# Patient Record
Sex: FEMALE | Race: AMERICAN INDIAN OR ALASKA NATIVE
[De-identification: names, ages, dates, MRNs, and addresses within clinical notes are randomized per-mention and may not be internally consistent; named-entity substitution may affect disease eponyms.]

---

## 2020-11-12 ENCOUNTER — HOSPITAL ENCOUNTER (OUTPATIENT)
Dept: HOSPITAL 5 - ED | Age: 55
Setting detail: OBSERVATION
LOS: 1 days | Discharge: HOME | End: 2020-11-13
Attending: INTERNAL MEDICINE | Admitting: INTERNAL MEDICINE
Payer: COMMERCIAL

## 2020-11-12 DIAGNOSIS — E78.5: ICD-10-CM
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
## 2020-11-12 DIAGNOSIS — R55: ICD-10-CM
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
## 2020-11-12 DIAGNOSIS — S70.11XA: Primary | ICD-10-CM

## 2020-11-12 DIAGNOSIS — I10: ICD-10-CM

## 2020-11-12 DIAGNOSIS — F17.210: ICD-10-CM
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
## 2020-11-12 DIAGNOSIS — E66.9: ICD-10-CM

## 2020-11-12 DIAGNOSIS — E16.2: ICD-10-CM

## 2020-11-12 LAB
ALBUMIN SERPL-MCNC: 4 G/DL (ref 3.9–5)
ALT SERPL-CCNC: 24 UNITS/L (ref 7–56)
BASOPHILS # (AUTO): 0.1 K/MM3 (ref 0–0.1)
BASOPHILS NFR BLD AUTO: 0.6 % (ref 0–1.8)
BILIRUB UR QL STRIP: (no result)
BLOOD UR QL VISUAL: (no result)
BUN SERPL-MCNC: 10 MG/DL (ref 7–17)
BUN/CREAT SERPL: 17 %
CALCIUM SERPL-MCNC: 9.2 MG/DL (ref 8.4–10.2)
EOSINOPHIL # BLD AUTO: 0 K/MM3 (ref 0–0.4)
EOSINOPHIL NFR BLD AUTO: 0.4 % (ref 0–4.3)
HCT VFR BLD CALC: 39.1 % (ref 30.3–42.9)
HEMOLYSIS INDEX: 2
HGB BLD-MCNC: 12.6 GM/DL (ref 10.1–14.3)
INR PPP: 0.9 (ref 0.87–1.13)
LYMPHOCYTES # BLD AUTO: 2.5 K/MM3 (ref 1.2–5.4)
LYMPHOCYTES NFR BLD AUTO: 22.1 % (ref 13.4–35)
MCHC RBC AUTO-ENTMCNC: 32 % (ref 30–34)
MCV RBC AUTO: 80 FL (ref 79–97)
MONOCYTES # (AUTO): 1 K/MM3 (ref 0–0.8)
MONOCYTES % (AUTO): 8.5 % (ref 0–7.3)
MUCOUS THREADS #/AREA URNS HPF: (no result) /HPF
PH UR STRIP: 6 [PH] (ref 5–7)
PLATELET # BLD: 251 K/MM3 (ref 140–440)
PROT UR STRIP-MCNC: (no result) MG/DL
RBC # BLD AUTO: 4.88 M/MM3 (ref 3.65–5.03)
RBC #/AREA URNS HPF: < 1 /HPF (ref 0–6)
UROBILINOGEN UR-MCNC: < 2 MG/DL (ref ?–2)
WBC #/AREA URNS HPF: 2 /HPF (ref 0–6)

## 2020-11-12 PROCEDURE — 93880 EXTRACRANIAL BILAT STUDY: CPT

## 2020-11-12 PROCEDURE — 83735 ASSAY OF MAGNESIUM: CPT

## 2020-11-12 PROCEDURE — 72170 X-RAY EXAM OF PELVIS: CPT

## 2020-11-12 PROCEDURE — 82962 GLUCOSE BLOOD TEST: CPT

## 2020-11-12 PROCEDURE — G0378 HOSPITAL OBSERVATION PER HR: HCPCS

## 2020-11-12 PROCEDURE — 85025 COMPLETE CBC W/AUTO DIFF WBC: CPT

## 2020-11-12 PROCEDURE — 71045 X-RAY EXAM CHEST 1 VIEW: CPT

## 2020-11-12 PROCEDURE — 80048 BASIC METABOLIC PNL TOTAL CA: CPT

## 2020-11-12 PROCEDURE — 96374 THER/PROPH/DIAG INJ IV PUSH: CPT

## 2020-11-12 PROCEDURE — 85379 FIBRIN DEGRADATION QUANT: CPT

## 2020-11-12 PROCEDURE — 80320 DRUG SCREEN QUANTALCOHOLS: CPT

## 2020-11-12 PROCEDURE — 84439 ASSAY OF FREE THYROXINE: CPT

## 2020-11-12 PROCEDURE — 36415 COLL VENOUS BLD VENIPUNCTURE: CPT

## 2020-11-12 PROCEDURE — 96375 TX/PRO/DX INJ NEW DRUG ADDON: CPT

## 2020-11-12 PROCEDURE — 81001 URINALYSIS AUTO W/SCOPE: CPT

## 2020-11-12 PROCEDURE — 99406 BEHAV CHNG SMOKING 3-10 MIN: CPT

## 2020-11-12 PROCEDURE — 80053 COMPREHEN METABOLIC PANEL: CPT

## 2020-11-12 PROCEDURE — 84484 ASSAY OF TROPONIN QUANT: CPT

## 2020-11-12 PROCEDURE — G0480 DRUG TEST DEF 1-7 CLASSES: HCPCS

## 2020-11-12 PROCEDURE — 96361 HYDRATE IV INFUSION ADD-ON: CPT

## 2020-11-12 PROCEDURE — 99285 EMERGENCY DEPT VISIT HI MDM: CPT

## 2020-11-12 PROCEDURE — 85610 PROTHROMBIN TIME: CPT

## 2020-11-12 PROCEDURE — 93005 ELECTROCARDIOGRAM TRACING: CPT

## 2020-11-12 PROCEDURE — 82550 ASSAY OF CK (CPK): CPT

## 2020-11-12 PROCEDURE — 70450 CT HEAD/BRAIN W/O DYE: CPT

## 2020-11-12 PROCEDURE — 73552 X-RAY EXAM OF FEMUR 2/>: CPT

## 2020-11-12 PROCEDURE — 84443 ASSAY THYROID STIM HORMONE: CPT
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
## 2020-11-12 NOTE — XRAY REPORT
PELVIS ONE VIEW



INDICATION / CLINICAL INFORMATION:

leg pain.



COMPARISON:

None available.

 

FINDINGS:

BONES/JOINT(S): No acute fracture or subluxation. No significant degenerative changes.



SOFT TISSUES: No significant abnormality.



ADDITIONAL FINDINGS: None.







Signer Name: Danyel Morales MD 

Signed: 11/12/2020 6:01 PM

Workstation Name: [id number]

## 2020-11-12 NOTE — XRAY REPORT
RIGHT FEMUR 2 VIEWS



INDICATION / CLINICAL INFORMATION:

right leg pain fall.



COMPARISON:

None available.

 

FINDINGS:

BONES/JOINT(S): No acute fracture or subluxation. No significant degenerative changes.



SOFT TISSUES: No significant abnormality.



ADDITIONAL FINDINGS: None.







Signer Name: Danyel Morales MD 

Signed: 11/12/2020 5:59 PM

Workstation Name: [id number]

## 2020-11-12 NOTE — EMERGENCY DEPARTMENT REPORT
ED General Adult HPI





- General


Chief complaint: Syncope


Stated complaint: WEAKNESS,NEAR SYNCOPE


PUI?: No


Time Seen by Provider: 11/12/20 16:57


Source: patient, EMS ( EMS documentation not available at time of chart dictat

ion ), RN notes reviewed


Mode of arrival: Stretcher


Limitations: No Limitations





- History of Present Illness


Initial comments: 





The patient was evaluated in the emergency department for symptoms described in 

the history of present illness.  He/she was evaluated in the context of the 

global COVID-19 pandemic, which necessitated consideration that the patient 

might be at risk for infection with the virus that causes COVID-19.  

Institutional protocols and algorithms that pertain to the evaluation of 

patients at risk for COVID-19 are in a state of rapid change based on 

information released by regulatory bodies including the CDC and federal and 

state organizations.  These policies and algorithms were followed during the 

patient's care in the emergency department.  Please note that these policies, 

procedures and recommendations changed on a rapid basis.





During the history and physical examination, I am chaperoned/escorted by nurse 

Nicole Pritchett








Patient is 55, with a history of high cholesterol and obesity.  She may also 

have a history of hypertension.  She does not have a history of diabetes that 

she is aware of.  She does not take any hypoglycemic medication.  She is brought

to the hospital today by EMS with complaints of recurrent syncope/loss of 

consciousness, and recurrent idiopathic hypoglycemia.





Patient reports no new medications.  She has a primary care doctor, but she 

cannot recall the name with her primary care doctor.





She states that she was shopping in the food market, earlier on today, when she 

felt lightheaded, which was painless, and she believes that she lost consci

ousness.  Family contacted EMS, who found the patient to be hypoglycemic, and 

the patient was given orange juice.





Shortly thereafter, in the supermarket, the patient had another episode of 

hypoglycemia, and lost consciousness again.





This resolved on its own.  The patient then went home, and had orange juice, and

a turkey sandwich.





She again had an episode of loss of consciousness, and emergency medical 

services were again activated.





Patient is not sure if she hit her head.  She has no headache, neck pain, chest 

pain, abdominal pain or shortness of breath.  She has right posterior hamstring 

and hip pain, where she fell onto her right leg.





She denies DVT, pulmonary embolism risk factors.





She denies urinary symptoms.





She has sharp throbbing aching right hip pain, which increases with palpation, 

range of motion, and decreases with rest, and it does not radiate anywhere.  No 

hematemesis.  No bright red blood per rectum.





This has never happened to her before.


-: Sudden


Consistency: intermittent


Improves with: none


Worsens with: none





ED Review of Systems


ROS: 


Stated complaint: WEAKNESS,NEAR SYNCOPE


Other details as noted in HPI





Constitutional: malaise, weakness.  denies: fever


Eyes: denies: eye discharge


Cardiovascular: syncope.  denies: chest pain


Gastrointestinal: denies: abdominal pain, hematemesis, melena, hematochezia


Genitourinary: denies: dysuria


Musculoskeletal: arthralgia, myalgia


Skin: lesions (Ecchymosis on right posterior hamstring)


Neurological: weakness.  denies: headache


Hematological/Lymphatic: denies: easy bleeding





ED Physical Exam





- General


General appearance: alert, in no apparent distress





- Head


Head exam: Present: atraumatic, normocephalic





- Eye


Eye exam: Present: normal appearance, EOMI, other (Visual acuity intact to 

finger counting, color perception, reading at a close distance).  Absent: 

nystagmus





- ENT


ENT exam: Present: normal exam, normal orophraynx, mucous membranes moist, 

normal external ear exam





- Neck


Neck exam: Present: normal inspection, full ROM.  Absent: tenderness, 

meningismus





- Respiratory


Respiratory exam: Present: normal lung sounds bilaterally.  Absent: respiratory 

distress, wheezes, rales, rhonchi, stridor, decreased breath sounds





- Cardiovascular


Cardiovascular Exam: Present: regular rate, normal rhythm, normal heart sounds. 

Absent: bradycardia, tachycardia, irregular rhythm, systolic murmur, diastolic 

murmur, rubs, gallop





- GI/Abdominal


GI/Abdominal exam: Present: soft.  Absent: distended, tenderness, guarding, 

rebound, rigid, pulsatile mass





- Extremities Exam


Extremities exam: Present: normal inspection, full ROM, tenderness (There is 

right posterior and proximal thigh tenderness.  There is an oval 6 x 6 cm right-

sided thigh ecchymosis.  Chaperoned by nurse Nicole Cabrera), other (2+ pulses

noted in the bilateral upper and lower extremities.  There is no palpable cord. 

 negative Homans sign.  Muscular compartments are soft.  The pelvis is stable.).

 Absent: calf tenderness





- Back Exam


Back exam: Present: normal inspection, full ROM.  Absent: tenderness, CVA 

tenderness (R), CVA tenderness (L), paraspinal tenderness, vertebral tenderness





- Neurological Exam


Neurological exam: Present: alert, oriented X3, other (No facial droop.  Tongue 

midline.  Extraocular movements intact bilaterally.  Facial sensation intact to 

light touch in V1, V2, V3 distribution bilaterally.  5 and a 5 strength in 4 

extremities.  Sensation intact to light touch in 4 extremities.).  Absent: motor

sensory deficit





- Psychiatric


Psychiatric exam: Present: normal affect, normal mood





- Skin


Skin exam: Present: warm, ecchymosis.  Absent: rash





ED Course


                                   Vital Signs











  11/12/20





  19:07


 


Pulse Rate 86


 


Respiratory 22





Rate 


 


Blood Pressure 130/86





[Left] 


 


O2 Sat by Pulse 99





Oximetry 














- Reevaluation(s)


Reevaluation #1: 





11/12/20 18:55


Differential diagnosis, including but not limited to: Hypoglycemia, thyroid 

derangement, orthostasis, vagal event, structural cardiac disease, pulmonary 

embolism, contusion, sprain, strain, fracture, dislocation





Assessment and plan: 55-year-old female, who was afebrile, with reassuring vital

 signs, clinically sober, with a GCS of 15, NIH score of 0, negative D-dimer, 

not currently tachycardic, tachypneic or hypoxic, no pulmonary embolism or DVT 

risk factors, low risk by Wells criteria, with multiple episodes of recurrent 

syncope, likely secondary to idiopathic hypoglycemia.





Patient will be fed, started on dextrose drip.





X-rays of the chest, pelvis, right femur negative for acute findings.  CT scan 

of the brain grossly negative for bleed, formal radiology interpretation is 

pending at this time.  Patient meets criteria for hospitalization/admission as 

she is had multiple episodes of unexplained hypoglycemia thus far, not easily 

attributable to medications, as the patient reports he does not take any 

diabetic medications.





Patient will be started on frequent Accu-Cheks, dextrose infusion, D50 as 

needed, and she will be fed.





Hospital physician will be paged to arrange admission.





Discussed this plan of care with the patient, who verbalized understanding, and 

who is amenable to this plan of care.








Reevaluation #2: 





11/12/20 19:03


CT scan of the brain is negative for acute finding


Reevaluation #3: 





11/12/20 19:25


Hospital physician, Dr. Sinha, to admit patient to the medical service





ED Medical Decision Making





- Lab Data


Result diagrams: 


                                 11/12/20 17:48





                                 11/12/20 17:48








                                   Lab Results











  11/12/20 11/12/20 11/12/20 Range/Units





  17:48 17:48 17:48 


 


WBC     (4.5-11.0)  K/mm3


 


RBC     (3.65-5.03)  M/mm3


 


Hgb     (10.1-14.3)  gm/dl


 


Hct     (30.3-42.9)  %


 


MCV     (79-97)  fl


 


MCH     (28-32)  pg


 


MCHC     (30-34)  %


 


RDW     (13.2-15.2)  %


 


Plt Count     (140-440)  K/mm3


 


Lymph % (Auto)     (13.4-35.0)  %


 


Mono % (Auto)     (0.0-7.3)  %


 


Eos % (Auto)     (0.0-4.3)  %


 


Baso % (Auto)     (0.0-1.8)  %


 


Lymph # (Auto)     (1.2-5.4)  K/mm3


 


Mono # (Auto)     (0.0-0.8)  K/mm3


 


Eos # (Auto)     (0.0-0.4)  K/mm3


 


Baso # (Auto)     (0.0-0.1)  K/mm3


 


Seg Neutrophils %     (40.0-70.0)  %


 


Seg Neutrophils #     (1.8-7.7)  K/mm3


 


PT     (12.2-14.9)  Sec.


 


INR     (0.87-1.13)  


 


D-Dimer     (0-234)  ng/mlDDU


 


Sodium     (137-145)  mmol/L


 


Potassium     (3.6-5.0)  mmol/L


 


Chloride     ()  mmol/L


 


Carbon Dioxide     (22-30)  mmol/L


 


Anion Gap     mmol/L


 


BUN     (7-17)  mg/dL


 


Creatinine     (0.6-1.2)  mg/dL


 


Estimated GFR     ml/min


 


BUN/Creatinine Ratio     %


 


Glucose     ()  mg/dL


 


POC Glucose     ()  mg/dL


 


Calcium     (8.4-10.2)  mg/dL


 


Magnesium  2.40 H    (1.7-2.3)  mg/dL


 


Total Bilirubin     (0.1-1.2)  mg/dL


 


AST     (5-40)  units/L


 


ALT     (7-56)  units/L


 


Alkaline Phosphatase     ()  units/L


 


Total Creatine Kinase  503 H    ()  units/L


 


Troponin T     (0.00-0.029)  ng/mL


 


Total Protein     (6.3-8.2)  g/dL


 


Albumin     (3.9-5)  g/dL


 


Albumin/Globulin Ratio     %


 


Salicylates   0.4 L   (2.8-20.0)  mg/dL


 


Acetaminophen    5.0 L  (10.0-30.0)  ug/mL














  11/12/20 11/12/20 11/12/20 Range/Units





  17:48 17:48 17:48 


 


WBC  11.4 H    (4.5-11.0)  K/mm3


 


RBC  4.88    (3.65-5.03)  M/mm3


 


Hgb  12.6    (10.1-14.3)  gm/dl


 


Hct  39.1    (30.3-42.9)  %


 


MCV  80    (79-97)  fl


 


MCH  26 L    (28-32)  pg


 


MCHC  32    (30-34)  %


 


RDW  15.1    (13.2-15.2)  %


 


Plt Count  251    (140-440)  K/mm3


 


Lymph % (Auto)  22.1    (13.4-35.0)  %


 


Mono % (Auto)  8.5 H    (0.0-7.3)  %


 


Eos % (Auto)  0.4    (0.0-4.3)  %


 


Baso % (Auto)  0.6    (0.0-1.8)  %


 


Lymph # (Auto)  2.5    (1.2-5.4)  K/mm3


 


Mono # (Auto)  1.0 H    (0.0-0.8)  K/mm3


 


Eos # (Auto)  0.0    (0.0-0.4)  K/mm3


 


Baso # (Auto)  0.1    (0.0-0.1)  K/mm3


 


Seg Neutrophils %  68.4    (40.0-70.0)  %


 


Seg Neutrophils #  7.8 H    (1.8-7.7)  K/mm3


 


PT   12.3   (12.2-14.9)  Sec.


 


INR   0.90   (0.87-1.13)  


 


D-Dimer   < 135.00   (0-234)  ng/mlDDU


 


Sodium    141  (137-145)  mmol/L


 


Potassium    3.8  (3.6-5.0)  mmol/L


 


Chloride    104.9  ()  mmol/L


 


Carbon Dioxide    29  (22-30)  mmol/L


 


Anion Gap    11  mmol/L


 


BUN    10  (7-17)  mg/dL


 


Creatinine    0.6  (0.6-1.2)  mg/dL


 


Estimated GFR    > 60  ml/min


 


BUN/Creatinine Ratio    17  %


 


Glucose    71  ()  mg/dL


 


POC Glucose     ()  mg/dL


 


Calcium    9.2  (8.4-10.2)  mg/dL


 


Magnesium    2.40 H  (1.7-2.3)  mg/dL


 


Total Bilirubin    0.30  (0.1-1.2)  mg/dL


 


AST    23  (5-40)  units/L


 


ALT    24  (7-56)  units/L


 


Alkaline Phosphatase    95  ()  units/L


 


Total Creatine Kinase     ()  units/L


 


Troponin T    < 0.010  (0.00-0.029)  ng/mL


 


Total Protein    7.1  (6.3-8.2)  g/dL


 


Albumin    4.0  (3.9-5)  g/dL


 


Albumin/Globulin Ratio    1.3  %


 


Salicylates     (2.8-20.0)  mg/dL


 


Acetaminophen     (10.0-30.0)  ug/mL














  11/12/20 Range/Units





  18:14 


 


WBC   (4.5-11.0)  K/mm3


 


RBC   (3.65-5.03)  M/mm3


 


Hgb   (10.1-14.3)  gm/dl


 


Hct   (30.3-42.9)  %


 


MCV   (79-97)  fl


 


MCH   (28-32)  pg


 


MCHC   (30-34)  %


 


RDW   (13.2-15.2)  %


 


Plt Count   (140-440)  K/mm3


 


Lymph % (Auto)   (13.4-35.0)  %


 


Mono % (Auto)   (0.0-7.3)  %


 


Eos % (Auto)   (0.0-4.3)  %


 


Baso % (Auto)   (0.0-1.8)  %


 


Lymph # (Auto)   (1.2-5.4)  K/mm3


 


Mono # (Auto)   (0.0-0.8)  K/mm3


 


Eos # (Auto)   (0.0-0.4)  K/mm3


 


Baso # (Auto)   (0.0-0.1)  K/mm3


 


Seg Neutrophils %   (40.0-70.0)  %


 


Seg Neutrophils #   (1.8-7.7)  K/mm3


 


PT   (12.2-14.9)  Sec.


 


INR   (0.87-1.13)  


 


D-Dimer   (0-234)  ng/mlDDU


 


Sodium   (137-145)  mmol/L


 


Potassium   (3.6-5.0)  mmol/L


 


Chloride   ()  mmol/L


 


Carbon Dioxide   (22-30)  mmol/L


 


Anion Gap   mmol/L


 


BUN   (7-17)  mg/dL


 


Creatinine   (0.6-1.2)  mg/dL


 


Estimated GFR   ml/min


 


BUN/Creatinine Ratio   %


 


Glucose   ()  mg/dL


 


POC Glucose  66 L  ()  mg/dL


 


Calcium   (8.4-10.2)  mg/dL


 


Magnesium   (1.7-2.3)  mg/dL


 


Total Bilirubin   (0.1-1.2)  mg/dL


 


AST   (5-40)  units/L


 


ALT   (7-56)  units/L


 


Alkaline Phosphatase   ()  units/L


 


Total Creatine Kinase   ()  units/L


 


Troponin T   (0.00-0.029)  ng/mL


 


Total Protein   (6.3-8.2)  g/dL


 


Albumin   (3.9-5)  g/dL


 


Albumin/Globulin Ratio   %


 


Salicylates   (2.8-20.0)  mg/dL


 


Acetaminophen   (10.0-30.0)  ug/mL














- EKG Data


-: EKG Interpreted by Me


EKG shows normal: sinus rhythm


Rate: normal





- EKG Data


When compared to previous EKG there are: previous EKG unavailable





11/12/20 18:54


Sinus rhythm, 73 bpm, normal axis, normal CT interval, QTC prolonged, 452 ms, 

motion artifact, borderline poor R wave progression.  The EKG is abnormal.  The 

EKG is not a STEMI.  There is no prior for comparison.





- Radiology Data


Radiology results: pending, report reviewed, image reviewed





PELVIS ONE VIEW  INDICATION / CLINICAL INFORMATION: leg pain.  COMPARISON: None 

available.  FINDINGS: BONES/JOINT(S): No acute fracture or subluxation. No 

significant degenerative changes.  SOFT TISSUES: No significant abnormality.  

ADDITIONAL FINDINGS: None.    Signer Name: Danyel Morales MD Signed: 11/12/2020 

5:01 PM Workstation Name: [id number]





RIGHT FEMUR 2 VIEWS  INDICATION / CLINICAL INFORMATION: right leg pain fall.  

COMPARISON: None available.  FINDINGS: BONES/JOINT(S): No acute fracture or 

subluxation. No significant degenerative changes.  SOFT TISSUES: No significant 

abnormality.  ADDITIONAL FINDINGS: None.    Signer Name: Danyel Morales MD Signed:

 11/12/2020 4:59 PM





CHEST 1 VIEW 11/12/2020 4:58 PM  INDICATION / CLINICAL INFORMATION: syncope.  

COMPARISON: None available.  FINDINGS:  SUPPORT DEVICES: None.  HEART / 

MEDIASTINUM: No significant abnormality.  LUNGS / PLEURA: No significant 

pulmonary or pleural abnormality. No pneumothorax.  ADDITIONAL FINDINGS: No 

significant additional findings.  IMPRESSION: 1. No acute findings.  Signer 

Name: Danyel Morales MD Signed: 11/12/2020 4:59 PM Workstation Name: [id number]


Critical care attestation.: 


If time is entered above; I have spent that time in minutes in the direct care 

of this critically ill patient, excluding procedure time.








ED Disposition


Clinical Impression: 


 Hypoglycemia, Syncope, Thigh contusion, Fall





Disposition: DC-09 OP ADMIT IP TO THIS HOSP


Is pt being admited?: Yes


Does the pt Need Aspirin: No


Condition: Stable


Instructions:  Syncope (ED)


Referrals: 


FLIP AGUILAR MD [Primary Care Provider] - 3-5 Days

## 2020-11-12 NOTE — CAT SCAN REPORT
CT head/brain wo con



INDICATION / CLINICAL INFORMATION:

55 years Female; Syncope. 



TECHNIQUE: Routine CT head without contrast. All CT scans at this location are performed using CT dos
e reduction for ALARA by means of automated exposure control. 



COMPARISON: 

None.



FINDINGS:



BRAIN / INTRACRANIAL CONTENTS: The motion degrades the image quality despite repeat imaging. However,
 the brain appears to demonstrate appropriate attenuation for age. The ventricular system is within n
ormal limits in size and configuration. There is no CT evidence of acute intracranial hemorrhage or s
ignificant mass effect.



ORBITS: No significant abnormality of visualized orbits.

SINUSES / MASTOIDS: No significant abnormality in the visualized paranasal sinuses or mastoid air thad
ls.



CRANIOCERVICAL JUNCTION: No significant abnormality.

ADDITIONAL FINDINGS: None. 



IMPRESSION:

1. There is no CT evidence of acute intracranial process. 



Signer Name: Esau Blum MD 

Signed: 11/12/2020 6:50 PM

Workstation Name: [id number]

## 2020-11-12 NOTE — XRAY REPORT
CHEST 1 VIEW 11/12/2020 4:58 PM



INDICATION / CLINICAL INFORMATION: syncope.



COMPARISON: None available.



FINDINGS:



SUPPORT DEVICES: None.



HEART / MEDIASTINUM: No significant abnormality. 



LUNGS / PLEURA: No significant pulmonary or pleural abnormality. No pneumothorax. 



ADDITIONAL FINDINGS: No significant additional findings.



IMPRESSION:

1. No acute findings.



Signer Name: Danyel Morales MD 

Signed: 11/12/2020 5:59 PM

Workstation Name: [id number]

## 2020-11-13 VITALS — SYSTOLIC BLOOD PRESSURE: 133 MMHG | DIASTOLIC BLOOD PRESSURE: 87 MMHG

## 2020-11-13 LAB
BUN SERPL-MCNC: 12 MG/DL (ref 7–17)
BUN/CREAT SERPL: 17 %
CALCIUM SERPL-MCNC: 8.6 MG/DL (ref 8.4–10.2)
HEMOLYSIS INDEX: 2

## 2020-11-13 RX ADMIN — Medication SCH ML: at 01:07

## 2020-11-13 RX ADMIN — DEXTROSE AND SODIUM CHLORIDE SCH MLS/HR: 5; .45 INJECTION, SOLUTION INTRAVENOUS at 08:50

## 2020-11-13 RX ADMIN — DEXTROSE AND SODIUM CHLORIDE SCH MLS/HR: 5; .45 INJECTION, SOLUTION INTRAVENOUS at 01:07

## 2020-11-13 RX ADMIN — ACETAMINOPHEN PRN MG: 325 TABLET ORAL at 09:50

## 2020-11-13 RX ADMIN — ACETAMINOPHEN PRN MG: 325 TABLET ORAL at 03:32

## 2020-11-13 RX ADMIN — Medication SCH ML: at 13:34

## 2020-11-13 NOTE — VASCULAR LAB REPORT
DUPLEX DOPPLER ULTRASOUND CAROTID, BILATERAL



INDICATION / CLINICAL INFORMATION:

Syncope.



COMPARISON:

None available.



FINDINGS:



RIGHT CAROTID:

- PLAQUE ESTIMATE (%): < 50%

- CCA velocity: 80 cm/sec.

- ICA peak systolic velocity: 104 cm/sec.

- ICA/CCA PSV Ratio: 1.3



Right Vertebral Artery: Antegrade flow.



LEFT CAROTID:

- PLAQUE ESTIMATE: < 50%

- CCA velocity: 82 cm/sec.

- ICA peak systolic velocity: 86 cm/sec.

- ICA/CCA PSV Ratio: 1.05



Left Vertebral Artery: Antegrade flow.



IMPRESSION:

1. Right Internal Carotid Artery: Less than 50% diameter stenosis.

2. Left Internal Carotid Artery: Less than 50% diameter stenosis.







Velocity criteria are extrapolated from diameter data as defined by the Society of Radiologists in Ul
trasound Consensus Conference, Radiology 2003; 229;340-346.

=======================================================

NO STENOSIS (NORMAL)

*  Plaque = none; ICA PSV < 125 cm/sec; ICA/CCA PSV Ratio < 2.0

<50% STENOSIS

*  Plaque < 50%; ICA PSV < 125 cm/sec; ICA/CCA PSV Ratio < 2.0

50-69% STENOSIS

*  Plaque > 50%; ICA PSV = 125-230 cm/sec; ICA/CCA PSV Ratio = 2.0-4.0

>70% BUT <100% STENOSIS

*  Plaque > 50%; ICA PSV < 230 cm/sec; ICA/CCA PSV Ratio > 4.0

NEAR OCCLUSION

*  Plaque = visible lumen; ICA PSV = high/low/none; ICA/CCA PSV Ratio = variable

TOTAL OCCLUSION

*  Plaque = no lumen; ICA PSV = none; ICA/CCA PSV Ratio = N/A

======================================================



Signer Name: Victor Hugo Ware MD 

Signed: 11/13/2020 2:51 PM

Workstation Name: [id number]

## 2020-11-13 NOTE — PROGRESS NOTE
Hospitalist Physical





- Constitutional


Vitals: 


                                        











Temp Pulse Resp BP Pulse Ox


 


 98.3 F   77   18   124/74   99 


 


 11/13/20 00:53  11/13/20 03:22  11/13/20 03:22  11/13/20 03:22  11/13/20 03:22











General appearance: Present: mild distress, obese





HEART Score





- HEART Score


Troponin: 


                                        











Troponin T  < 0.010 ng/mL (0.00-0.029)   11/12/20  17:48    














Results





- Labs


CBC & Chem 7: 


                                 11/12/20 17:48





                                 11/13/20 04:38


Labs: 


                             Laboratory Last Values











WBC  11.4 K/mm3 (4.5-11.0)  H  11/12/20  17:48    


 


RBC  4.88 M/mm3 (3.65-5.03)   11/12/20  17:48    


 


Hgb  12.6 gm/dl (10.1-14.3)   11/12/20  17:48    


 


Hct  39.1 % (30.3-42.9)   11/12/20  17:48    


 


MCV  80 fl (79-97)   11/12/20  17:48    


 


MCH  26 pg (28-32)  L  11/12/20  17:48    


 


MCHC  32 % (30-34)   11/12/20  17:48    


 


RDW  15.1 % (13.2-15.2)   11/12/20  17:48    


 


Plt Count  251 K/mm3 (140-440)   11/12/20  17:48    


 


Lymph % (Auto)  22.1 % (13.4-35.0)   11/12/20  17:48    


 


Mono % (Auto)  8.5 % (0.0-7.3)  H  11/12/20  17:48    


 


Eos % (Auto)  0.4 % (0.0-4.3)   11/12/20  17:48    


 


Baso % (Auto)  0.6 % (0.0-1.8)   11/12/20  17:48    


 


Lymph # (Auto)  2.5 K/mm3 (1.2-5.4)   11/12/20  17:48    


 


Mono # (Auto)  1.0 K/mm3 (0.0-0.8)  H  11/12/20  17:48    


 


Eos # (Auto)  0.0 K/mm3 (0.0-0.4)   11/12/20  17:48    


 


Baso # (Auto)  0.1 K/mm3 (0.0-0.1)   11/12/20  17:48    


 


Seg Neutrophils %  68.4 % (40.0-70.0)   11/12/20  17:48    


 


Seg Neutrophils #  7.8 K/mm3 (1.8-7.7)  H  11/12/20  17:48    


 


PT  12.3 Sec. (12.2-14.9)   11/12/20  17:48    


 


INR  0.90  (0.87-1.13)   11/12/20  17:48    


 


D-Dimer  < 135.00 ng/mlDDU (0-234)   11/12/20  17:48    


 


Sodium  146 mmol/L (137-145)  H  11/13/20  04:38    


 


Potassium  3.6 mmol/L (3.6-5.0)   11/13/20  04:38    


 


Chloride  110.0 mmol/L ()  H  11/13/20  04:38    


 


Carbon Dioxide  28 mmol/L (22-30)   11/13/20  04:38    


 


Anion Gap  12 mmol/L  11/13/20  04:38    


 


BUN  12 mg/dL (7-17)   11/13/20  04:38    


 


Creatinine  0.7 mg/dL (0.6-1.2)   11/13/20  04:38    


 


Estimated GFR  > 60 ml/min  11/13/20  04:38    


 


BUN/Creatinine Ratio  17 %  11/13/20  04:38    


 


Glucose  106 mg/dL ()  H  11/13/20  04:38    


 


POC Glucose  103 mg/dL ()   11/13/20  12:11    


 


Calcium  8.6 mg/dL (8.4-10.2)   11/13/20  04:38    


 


Magnesium  2.40 mg/dL (1.7-2.3)  H  11/12/20  17:48    


 


Magnesium  2.40 mg/dL (1.7-2.3)  H  11/12/20  17:48    


 


Total Bilirubin  0.30 mg/dL (0.1-1.2)   11/12/20  17:48    


 


AST  23 units/L (5-40)   11/12/20  17:48    


 


ALT  24 units/L (7-56)   11/12/20  17:48    


 


Alkaline Phosphatase  95 units/L ()   11/12/20  17:48    


 


Total Creatine Kinase  503 units/L ()  H  11/12/20  17:48    


 


Troponin T  < 0.010 ng/mL (0.00-0.029)   11/12/20  17:48    


 


Total Protein  7.1 g/dL (6.3-8.2)   11/12/20  17:48    


 


Albumin  4.0 g/dL (3.9-5)   11/12/20  17:48    


 


Albumin/Globulin Ratio  1.3 %  11/12/20  17:48    


 


TSH  1.610 mlU/mL (0.270-4.200)   11/12/20  17:48    


 


Free T4  0.98 ng/dL (0.76-1.46)   11/12/20  17:48    


 


Urine Color  Yellow  (Yellow)   11/12/20  20:20    


 


Urine Turbidity  Clear  (Clear)   11/12/20  20:20    


 


Urine pH  6.0  (5.0-7.0)   11/12/20  20:20    


 


Ur Specific Gravity  1.010  (1.003-1.030)   11/12/20  20:20    


 


Urine Protein  <15 mg/dl mg/dL (Negative)   11/12/20  20:20    


 


Urine Glucose (UA)  Neg mg/dL (Negative)   11/12/20  20:20    


 


Urine Ketones  Neg mg/dL (Negative)   11/12/20  20:20    


 


Urine Blood  Neg  (Negative)   11/12/20  20:20    


 


Urine Nitrite  Neg  (Negative)   11/12/20  20:20    


 


Urine Bilirubin  Neg  (Negative)   11/12/20  20:20    


 


Urine Urobilinogen  < 2.0 mg/dL (<2.0)   11/12/20  20:20    


 


Ur Leukocyte Esterase  Neg  (Negative)   11/12/20  20:20    


 


Urine WBC (Auto)  2.0 /HPF (0.0-6.0)   11/12/20  20:20    


 


Urine RBC (Auto)  < 1.0 /HPF (0.0-6.0)   11/12/20  20:20    


 


U Epithel Cells (Auto)  1.0 /HPF (0-13.0)   11/12/20  20:20    


 


Urine Mucus  Few /HPF  11/12/20  20:20    


 


Salicylates  0.4 mg/dL (2.8-20.0)  L  11/12/20  17:48    


 


Acetaminophen  5.0 ug/mL (10.0-30.0)  L  11/12/20  17:48    











Rosales/IV: 


                                        





Voiding Method                   Toilet


IV Catheter Type [Left Forearm   INT / Saline Lock


]                                











Active Medications





- Current Medications


Current Medications: 














Generic Name Dose Route Start Last Admin





  Trade Name Freq  PRN Reason Stop Dose Admin


 


Acetaminophen  650 mg  11/12/20 19:50  11/13/20 09:50





  Tylenol  PO   650 mg





  Q4H PRN   Administration





  Pain MILD(1-3)/Fever >100.5/HA  


 


Dextrose  50 gm  11/12/20 17:28 





  D50w (25gm) Vial  IV  





  Q30MIN PRN  





  Hypoglycemia  





  Protocol  


 


Dextrose/Sodium Chloride  1,000 mls @ 125 mls/hr  11/12/20 19:00  11/13/20 08:50





  D5/0.45ns  IV   125 mls/hr





  AS DIRECT JAY   Administration


 


Ondansetron HCl  4 mg  11/12/20 19:50 





  Zofran  IV  





  Q8H PRN  





  Nausea And Vomiting  


 


Sodium Chloride  10 ml  11/12/20 22:00  11/13/20 01:07





  Sodium Chloride Flush Syringe 10 Ml  IV   10 ml





  BID JAY   Administration


 


Sodium Chloride  10 ml  11/12/20 19:50 





  Sodium Chloride Flush Syringe 10 Ml  IV  





  PRN PRN  





  LINE FLUSH  


 


Zolpidem Tartrate  10 mg  11/13/20 01:48 





  Ambien  PO  





  QHS PRN  





  Sleep

## 2020-11-13 NOTE — DISCHARGE SUMMARY
Providers





- Providers


Date of Admission: 


11/12/20 19:50





Date of discharge: 11/13/20


Attending physician: 


AMY J KOCHERLA





Primary care physician: 


FLIP AGUILAR








Hospitalization


Reason for admission: Syncopal episode


Condition: Stable


Pertinent studies: 


CT head without contrast; no acute abnormality


X-ray pelvis; no acute abnormality


X-ray right femur; no acute abnormality


Chest x-ray; no acute abnormality


Carotid Doppler; no hemodynamically significant stenosis





Hospital course: 


56 YO Female patient with history of HLD, Obesity, HTN was admitted through 

emergency room with history of feeling lightheaded and passed out/syncopal 

episode, patient was evaluated in the emergency room CT head without contrast 

was negative for acute abnormalities.


Patient was admitted placed on fall precautions, and extensive imaging study 

which was all negative for acute abnormalities


Patient had an episode of hypoglycemia when she presented to the emergency room 

with blood pressures of 60s'


Extensive neuro work-up was negative, Patient did not have any new episodes of 

syncope, ambulatory tolerating oral nutrition


Today she is comfortable no new complaints vital signs stable physical 

examination is unremarkable


Patient is hemodynamically and clinically stable at discharge, Advised to seek 

private neurologist consultation ,


should she have recurrent episodes of dizziness or syncope


Patient is hemodynamically and clinically stable at discharge








--Discharge diagnosis;


--Syncope/vasovagal/abnormal


--Hypertension stable


--Dyslipidemia, continue statin


--Obesity; BMI 33.3, advised dietary modification lifestyle changes


--on Going tobacco use; smoking cessation counseling advised nicotine patch as 

needed


--History of alcohol use; strongly advised to quit drinking alcohol continue 

supportive care





Stable at discharge

















Disposition: DC-01 TO HOME OR SELFCARE


Time spent for discharge: 32 min





Core Measure Documentation





- Palliative Care


Palliative Care/ Comfort Measures: Not Applicable





- Core Measures


Any of the following diagnoses?: none





Exam





- Constitutional


Vitals: 


                                        











Temp Pulse Resp BP Pulse Ox


 


 98.3 F   77   18   124/74   99 


 


 11/13/20 00:53  11/13/20 03:22  11/13/20 03:22  11/13/20 03:22  11/13/20 03:22











General appearance: Present: no acute distress, well-nourished, obese





- EENT


Eyes: Present: PERRL, EOM intact





- Neck


Neck: Present: supple, normal ROM





- Respiratory


Respiratory effort: normal


Respiratory: bilateral: diminished, negative: rales, rhonchi, wheezing





- Cardiovascular


Rhythm: regular


Heart Sounds: Present: S1 & S2





- Extremities


Extremities: no ischemia, No edema





- Abdominal


General gastrointestinal: Present: soft, non-tender, non-distended, normal bowel

sounds





- Integumentary


Integumentary: Present: clear, warm





- Musculoskeletal


Musculoskeletal: strength equal bilaterally





- Psychiatric


Psychiatric: appropriate mood/affect, cooperative





- Neurologic


Neurologic: moves all extremities





Plan


Activity: advance as tolerated, fall precautions


Diet: other (cardiac diet)


Additional Instructions: Your sugar was very low when you presented to the 

emergency room , advised to take balanced diet, avoid low sugars, may have 

contributed to your syncope.  Fall precautions.  Advised to see private 

neurologist in 1 to 2 weeks for further evaluation and management as needed


Follow up with: 


FLIP AGUILAR MD [Primary Care Provider] - 3-5 Days


ABDIRASHID TAI MD [Staff Physician] - 7 Days


Prescriptions: 


oxyCODONE /ACETAMINOPHEN [Percocet 5/325 mg] 1 tab PO BID PRN #8 tablet


 PRN Reason: Pain, Moderate (4-6)